# Patient Record
Sex: MALE | Race: OTHER | Employment: UNEMPLOYED
[De-identification: names, ages, dates, MRNs, and addresses within clinical notes are randomized per-mention and may not be internally consistent; named-entity substitution may affect disease eponyms.]

---

## 2024-05-24 ENCOUNTER — APPOINTMENT (OUTPATIENT)
Facility: HOSPITAL | Age: 19
End: 2024-05-24

## 2024-05-24 ENCOUNTER — HOSPITAL ENCOUNTER (EMERGENCY)
Facility: HOSPITAL | Age: 19
Discharge: HOME | End: 2024-05-24

## 2024-05-24 VITALS
HEART RATE: 73 BPM | TEMPERATURE: 98.6 F | SYSTOLIC BLOOD PRESSURE: 108 MMHG | BODY MASS INDEX: 21.66 KG/M2 | OXYGEN SATURATION: 98 % | HEIGHT: 65 IN | RESPIRATION RATE: 18 BRPM | DIASTOLIC BLOOD PRESSURE: 61 MMHG | WEIGHT: 130 LBS

## 2024-05-24 DIAGNOSIS — J18.9 PNEUMONIA OF LEFT LOWER LOBE DUE TO INFECTIOUS ORGANISM: Primary | ICD-10-CM

## 2024-05-24 LAB
FLUAV RNA SPEC QL NAA+PROBE: NOT DETECTED
FLUBV RNA SPEC QL NAA+PROBE: NOT DETECTED
SARS-COV-2 RNA RESP QL NAA+PROBE: NOT DETECTED

## 2024-05-24 PROCEDURE — 87636 SARSCOV2 & INF A&B AMP PRB: CPT

## 2024-05-24 PROCEDURE — 99284 EMERGENCY DEPT VISIT MOD MDM: CPT

## 2024-05-24 PROCEDURE — 71046 X-RAY EXAM CHEST 2 VIEWS: CPT

## 2024-05-24 PROCEDURE — 6370000000 HC RX 637 (ALT 250 FOR IP): Performed by: PHYSICIAN ASSISTANT

## 2024-05-24 RX ORDER — AMOXICILLIN 500 MG/1
1000 CAPSULE ORAL 3 TIMES DAILY
Qty: 60 CAPSULE | Refills: 0 | Status: SHIPPED | OUTPATIENT
Start: 2024-05-24 | End: 2024-06-03

## 2024-05-24 RX ORDER — DEXTROMETHORPHAN HYDROBROMIDE AND PROMETHAZINE HYDROCHLORIDE 15; 6.25 MG/5ML; MG/5ML
5 SYRUP ORAL
Qty: 118 ML | Refills: 0 | Status: SHIPPED | OUTPATIENT
Start: 2024-05-24 | End: 2024-05-31

## 2024-05-24 RX ORDER — AZITHROMYCIN 250 MG/1
TABLET, FILM COATED ORAL
Qty: 6 TABLET | Refills: 0 | Status: SHIPPED | OUTPATIENT
Start: 2024-05-24 | End: 2024-06-03

## 2024-05-24 RX ORDER — IBUPROFEN 400 MG/1
400 TABLET ORAL
Status: COMPLETED | OUTPATIENT
Start: 2024-05-24 | End: 2024-05-24

## 2024-05-24 RX ADMIN — IBUPROFEN 400 MG: 400 TABLET, FILM COATED ORAL at 18:00

## 2024-05-24 NOTE — DISCHARGE INSTRUCTIONS
Increase clear liquid intake, stay hydrated, take your medications as prescribed and follow-up with your primary care doctor

## 2024-06-05 NOTE — ED PROVIDER NOTES
[facility] EMERGENCY DEPT  EMERGENCY DEPARTMENT ENCOUNTER         Pt Name: Bossman Foote  MRN: [id number]  Birthdate 2005  Date of evaluation: 5/24/2024  Provider: Latesha Cooney PA-C   PCP: No primary care provider on file.     CHIEF COMPLAINT       Chief Complaint   Patient presents with    Generalized Body Aches        HISTORY OF PRESENT ILLNESS: 1 or more elements      History From: Patient and Patient's Wife  HPI Limitations: English as second language    Patient prefers for wife to translate at bedside when he needs clarification     Bossman Foote is a 18 y.o. male who presents to the emergency department with CC of cough and body aches x 1 week. He does not feel like his sxs have improved with otc medications. Associated nausea and vomiting intermittently. He denies night sweats, recent travel, unintentional weight loss, abdominal pain, diarrhea. Non-smoker. Denies drug use.     Nursing Notes were all reviewed and agreed with or any disagreements were addressed in the HPI.    PAST HISTORY     Past Medical History:  No past medical history on file.    Past Surgical History:  No past surgical history on file.    Family History:  No family history on file.    Social History:  Social History     Socioeconomic History    Marital status:        Allergies:  No Known Allergies    CURRENT MEDICATIONS      No current facility-administered medications for this encounter.     No current outpatient medications on file.          PHYSICAL EXAM      Vitals:    05/24/24 1730   BP: 108/61   Pulse: 73   Resp: 18   Temp: 98.6 °F (37 °C)   SpO2: 98%   Weight: 59 kg (130 lb)   Height: 1.651 m (5' 5\")     Physical Exam  Vitals and nursing note reviewed.   Constitutional:       General: He is not in acute distress.     Appearance: Normal appearance. He is not ill-appearing or diaphoretic.   HENT:      Head: Normocephalic and atraumatic.      Nose: Nose normal.      Mouth/Throat:      Mouth: Mucous membranes are